# Patient Record
Sex: FEMALE | Race: BLACK OR AFRICAN AMERICAN | HISPANIC OR LATINO | URBAN - METROPOLITAN AREA
[De-identification: names, ages, dates, MRNs, and addresses within clinical notes are randomized per-mention and may not be internally consistent; named-entity substitution may affect disease eponyms.]

---

## 2018-01-09 NOTE — RESULT NOTES
Verified Results  * XR FOOT 3+ VIEW LEFT [id number] 05:18PM Jagdish Powers Order Number: [id number]     Test Name Result Flag Reference   XR FOOT 3+ VW LEFT (Report)     LEFT FOOT     INDICATION: Medial left foot pain     COMPARISON: None     VIEWS: 3; 3 images     FINDINGS:     There is no acute fracture or dislocation  Old fracture versus ununited secondary center of ossification base of 5th metatarsal      No significant degenerative changes  Posterior heel spur  No lytic or blastic lesions are seen  Soft tissues are unremarkable  IMPRESSION:     No acute osseous abnormality         Workstation performed: [id number]     Signed by:   Tracey Marcial MD   2/12/16

## 2018-01-12 NOTE — PROGRESS NOTES
Assessment    1  Insomnia (780 52) (G47 00)   2  Folliculitis (246 6) (H32 1)    Plan  Insomnia    · HydrOXYzine HCl - 25 MG Oral Tablet; TAKE 1 TABLET AT BEDTIME AS NEEDED  Screening for depression    · *VB-Depression Screening; Status:Complete;   Done: [id number] 09:20AM    Discussion/Summary    Continue keflex for folliculitis  Schedule f/u here after seeing rheumatology for joint pain and continue tylneol  Trial of hydroxyzine for sleep  The patient was counseled regarding instructions for management, impressions  Chief Complaint  Patient here for an ER FU for Folliculitis, and given an Antibiotic      History of Present Illness  HPI: [de-identified] y/o F here for follow up from ED for folliculitis  She is taking keflex for this and lesions are resolving  No fevers  SHe has appointment for [facility] rheumatology on 2/10/16  She has pain in both hips and her left UE  She has had insomnia for a long time  She is trying melatonin and it is not working  Review of Systems    Constitutional: feeling tired  ENT: no ear ache, no loss of hearing, no nosebleeds or nasal discharge, no sore throat or hoarseness  Cardiovascular: no complaints of slow or fast heart rate, no chest pain, no palpitations, no leg claudication or lower extremity edema  Respiratory: no complaints of shortness of breath, no wheezing, no dyspnea on exertion, no orthopnea or PND  Gastrointestinal: no complaints of abdominal pain, no constipation, no nausea or diarrhea, no vomiting, no bloody stools  Genitourinary: no complaints of dysuria, no incontinence, no pelvic pain, no dysmenorrhea, no vaginal discharge or abnormal vaginal bleeding  Musculoskeletal: as noted in HPI  Integumentary: no complaints of skin rash or lesion, no itching or dry skin, no skin wounds  Neurological: no complaints of headache, no confusion, no numbness or tingling, no dizziness or fainting  Active Problems    1  Hypertension (401 9) (I10)   2   Neck pain (723 1) (M54 2)    Family History    1  Family history of cardiac disorder (V17 49) (Z82 49)   2  Family history of malignant neoplasm of breast (V16 3) (Z80 3)    Social History    · Denied: History of Alcohol use   · Denied: History of Drug use   · Never smoker  The social history was reviewed and updated today  Surgical History    1  History of Cholecystectomy    Current Meds   1  AmLODIPine Besylate 5 MG Oral Tablet; Therapy: ([place]) to Recorded   2  Atenolol 100 MG Oral Tablet; Therapy: ([place] Prows) to Recorded   3  Tylenol Extra Strength 500 MG Oral Tablet; TAKE 1 OR 2 TABLETS EVERY 6 HOURS AS   NEEDED FOR PAIN OR FEVER; Therapy: [id number] to (Last Rx:[id number]) Ordered    Allergies    1  No Known Drug Allergies    Vitals   Recorded: [id number] 09:15AM   Temperature 97 8 F   Heart Rate 61   Respiration 18   Systolic 629   Diastolic 64   Height 5 ft 3 5 in   Weight 145 lb 7 oz   BMI Calculated 25 36   BSA Calculated 1 7   O2 Saturation 100     Physical Exam    Constitutional   General appearance: No acute distress, well appearing and well nourished  Eyes   Conjunctiva and lids: No swelling, erythema or discharge  Pupils and irises: Equal, round and reactive to light  Ears, Nose, Mouth, and Throat   External inspection of ears and nose: Normal     Pulmonary   Respiratory effort: No increased work of breathing or signs of respiratory distress  Auscultation of lungs: Clear to auscultation  Cardiovascular   Palpation of heart: Normal PMI, no thrills  Auscultation of heart: Normal rate and rhythm, normal S1 and S2, without murmurs  Lymphatic   Palpation of lymph nodes in neck: No lymphadenopathy  Musculoskeletal   Gait and station: Normal     Skin   Skin and subcutaneous tissue: Normal without rashes or lesions  papule with dryness right forehead     Psychiatric   Orientation to person, place, and time: Normal     Mood and affect: Normal  Results/Data  *VB-Depression Screening [id number] 09:20AM Alaina Hernandez     Test Name Result Flag Reference   Depression Scale Result      Depression Screen - Negative For Symptoms     PHQ-2 Adult Depression Screening [id number] 09:19AM Vanesa Rick     Test Name Result Flag Reference   PHQ-2 Adult Depression Score 0     Q1: 0, Q2: 0   PHQ-2 Adult Depression Screening Negative         Signatures   Electronically signed by : SENG Alcaraz; Feb 2 2016  9:58AM EST                       (Author)    Electronically signed by : MERARY Rios ; Feb  3 2016 10:54AM EST

## 2023-08-03 ENCOUNTER — APPOINTMENT (RX ONLY)
Dept: URBAN - METROPOLITAN AREA CLINIC 89 | Facility: CLINIC | Age: 88
Setting detail: DERMATOLOGY
End: 2023-08-03

## 2023-08-03 DIAGNOSIS — L65.9 NONSCARRING HAIR LOSS, UNSPECIFIED: ICD-10-CM

## 2023-08-03 DIAGNOSIS — L65.0 TELOGEN EFFLUVIUM: ICD-10-CM

## 2023-08-03 PROCEDURE — ? OTC TREATMENT REGIMEN

## 2023-08-03 PROCEDURE — 99203 OFFICE O/P NEW LOW 30 MIN: CPT

## 2023-08-03 PROCEDURE — ? COUNSELING

## 2023-08-03 PROCEDURE — ? ORDER TESTS

## 2023-08-03 ASSESSMENT — LOCATION SIMPLE DESCRIPTION DERM: LOCATION SIMPLE: SCALP

## 2023-08-03 ASSESSMENT — LOCATION ZONE DERM: LOCATION ZONE: SCALP

## 2023-08-03 ASSESSMENT — LOCATION DETAILED DESCRIPTION DERM: LOCATION DETAILED: LEFT SUPERIOR PARIETAL SCALP

## 2023-08-03 NOTE — PROCEDURE: ORDER TESTS
Bill For Surgical Tray: no
Expected Date Of Service: 08/03/2023
Performing Laboratory: [id number]
Billing Type: [employer]

## 2023-08-03 NOTE — PROCEDURE: OTC TREATMENT REGIMEN
Patient Specific Otc Recommendations (Will Not Stick From Patient To Patient): Rogaine for woman
Detail Level: Zone

## 2023-08-03 NOTE — HPI: HAIR LOSS
How Did The Hair Loss Occur?: gradual in onset
Additional History: Son in law  recently. Daughter extremely stressed.